# Patient Record
Sex: MALE | HISPANIC OR LATINO | URBAN - METROPOLITAN AREA
[De-identification: names, ages, dates, MRNs, and addresses within clinical notes are randomized per-mention and may not be internally consistent; named-entity substitution may affect disease eponyms.]

---
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
## 2020-04-08 ENCOUNTER — TELEPHONE (OUTPATIENT)
Dept: SCHEDULING | Facility: IMAGING CENTER | Age: 20
End: 2020-04-08

## 2020-04-10 ENCOUNTER — OFFICE VISIT (OUTPATIENT)
Dept: MEDICAL GROUP | Facility: LAB | Age: 20
End: 2020-04-10

## 2020-04-10 VITALS
RESPIRATION RATE: 13 BRPM | BODY MASS INDEX: 40.8 KG/M2 | DIASTOLIC BLOOD PRESSURE: 82 MMHG | HEIGHT: 70 IN | OXYGEN SATURATION: 96 % | WEIGHT: 285 LBS | TEMPERATURE: 97.9 F | HEART RATE: 89 BPM | SYSTOLIC BLOOD PRESSURE: 126 MMHG

## 2020-04-10 DIAGNOSIS — J45.20 MILD INTERMITTENT ASTHMA WITHOUT COMPLICATION: ICD-10-CM

## 2020-04-10 DIAGNOSIS — F33.0 MILD EPISODE OF RECURRENT MAJOR DEPRESSIVE DISORDER (HCC): ICD-10-CM

## 2020-04-10 DIAGNOSIS — F90.2 ATTENTION DEFICIT HYPERACTIVITY DISORDER (ADHD), COMBINED TYPE: ICD-10-CM

## 2020-04-10 DIAGNOSIS — G43.009 MIGRAINE WITHOUT AURA AND WITHOUT STATUS MIGRAINOSUS, NOT INTRACTABLE: ICD-10-CM

## 2020-04-10 PROCEDURE — 99203 OFFICE O/P NEW LOW 30 MIN: CPT | Performed by: FAMILY MEDICINE

## 2020-04-10 RX ORDER — GUANFACINE 2 MG/1
TABLET, EXTENDED RELEASE ORAL
COMMUNITY
End: 2020-04-10

## 2020-04-10 RX ORDER — GUANFACINE 1 MG/1
TABLET, EXTENDED RELEASE ORAL
COMMUNITY
End: 2020-04-10

## 2020-04-10 RX ORDER — ESCITALOPRAM OXALATE 20 MG/1
20 TABLET ORAL DAILY
Qty: 30 TAB | Refills: 3
Start: 2020-04-10 | End: 2020-05-10

## 2020-04-10 RX ORDER — FLUTICASONE PROPIONATE 50 MCG
SPRAY, SUSPENSION (ML) NASAL
COMMUNITY
End: 2020-04-10

## 2020-04-10 RX ORDER — ALBUTEROL SULFATE 90 UG/1
AEROSOL, METERED RESPIRATORY (INHALATION)
COMMUNITY
End: 2020-04-10

## 2020-04-10 RX ORDER — GUANFACINE 2 MG/1
TABLET, EXTENDED RELEASE ORAL
COMMUNITY

## 2020-04-10 RX ORDER — ESCITALOPRAM OXALATE 20 MG/1
TABLET ORAL
COMMUNITY
End: 2020-04-10

## 2020-04-10 RX ORDER — TRIAMCINOLONE ACETONIDE 1 MG/G
OINTMENT TOPICAL
COMMUNITY
End: 2020-04-10

## 2020-04-10 RX ORDER — CLINDAMYCIN AND BENZOYL PEROXIDE 10; 50 MG/G; MG/G
GEL TOPICAL
COMMUNITY
End: 2020-04-10

## 2020-04-10 SDOH — HEALTH STABILITY: MENTAL HEALTH: HOW OFTEN DO YOU HAVE A DRINK CONTAINING ALCOHOL?: NEVER

## 2020-04-10 ASSESSMENT — ENCOUNTER SYMPTOMS
FEVER: 0
VOMITING: 0
BLURRED VISION: 0
WHEEZING: 0
CHILLS: 0
PALPITATIONS: 0
ABDOMINAL PAIN: 0
CONSTIPATION: 0
NAUSEA: 0
DIARRHEA: 0

## 2020-04-10 ASSESSMENT — PATIENT HEALTH QUESTIONNAIRE - PHQ9
5. POOR APPETITE OR OVEREATING: 1 - SEVERAL DAYS
CLINICAL INTERPRETATION OF PHQ2 SCORE: 1
SUM OF ALL RESPONSES TO PHQ QUESTIONS 1-9: 6

## 2020-04-10 NOTE — PROGRESS NOTES
Micah Pena is a 19 y.o. male here for   Chief Complaint   Patient presents with   • Establish Care   • Emesis     Vomiting, once on wednesday. HX of migreine        HPI:  Micah is a very pleasant 19 y.o. male.     #Migraines  -Chronic condition, new to me.  -States is having migraines approximately 1-2 times a month.  -Has full headaches, throbbing, associated with phonophobia, photophobia, nausea, vomiting.  -Treat with magnesium.  -States he has no difficulty or problem with the amount of control he is in.    #ADHD  -Chronic condition, new to me.  Both hyperactive and attention deficit disorder.  -Is been on previous medication; however, currently is taking guanfacine 2 mg for the last 2 to 3 years.  He states that it is been very helpful, been able to focus better at school in, as well as at work.  -Denies any side effect from medication, wishes to continue medication at this time.    #Anxiety, depression  -Patient states his longstanding history of anxiety and depression.  He is currently on Lexapro 20 mg daily.  Is compliant medication.  Denies any side effects including increased headaches, GI upset, sexual dysfunction.  -States that it is helping.  He does have slightly increased anxiety due to the pandemic; however, he is able to handle this new anxiety well and has no concerns or questions at this time.  -Denies any suicidal/homicidal ideations, hallucinations, delusions, paranoia.     #Asthma  -Chronic issue, new to me.  He states that his is gotten older the asthma has improved.  -Longstanding history since a child.    -He has been using albuterol a couple of times every month.  Denies any wheezing, shortness of breath, cough, difficulty breathing.        Current medicines (including changes today)  Current Outpatient Medications   Medication Sig Dispense Refill   • GuanFACINE HCl 2 MG TABLET SR 24 HR guanfacine ER 2 mg tablet,extended release 24 hr   TAKE 1 TABLET BY MOUTH EVERY DAY AT BEDTIME  "AS DIRECTED     • escitalopram (LEXAPRO) 20 MG tablet Take 1 Tab by mouth every day for 30 days. 30 Tab 3   • albuterol (VENTOLIN HFA) 108 (90 Base) MCG/ACT Aero Soln inhalation aerosol INHALE 2 PUFFS BY MOUTH EVERY 6 HOURS AS NEEDED FOR WHEEZING 18 Inhaler 1   • adapalene (DIFFERIN) 0.1 % cream Apply pea size amount to affected skin on face and back once each night 45 g 3   • CLARITIN PO Take  by mouth.       No current facility-administered medications for this visit.      He  has a past medical history of Allergy, Anxiety, ASTHMA, Depression, Head ache, and Migraine.  He  has no past surgical history on file.  Social History     Tobacco Use   • Smoking status: Never Smoker   • Smokeless tobacco: Never Used   Substance Use Topics   • Alcohol use: No   • Drug use: No     Social History     Social History Narrative   • Not on file     Family History   Problem Relation Age of Onset   • No Known Problems Mother    • Diabetes Father      Family Status   Relation Name Status   • Mo  Alive   • Fa  Alive         ROS  Review of Systems   Constitutional: Negative for chills and fever.   HENT: Negative for hearing loss.    Eyes: Negative for blurred vision.   Respiratory: Negative for wheezing.    Cardiovascular: Negative for chest pain and palpitations.   Gastrointestinal: Negative for abdominal pain, constipation, diarrhea, nausea and vomiting.   Skin: Negative for rash.   All other systems reviewed and are negative.       Objective:     /82 (BP Location: Right arm, Patient Position: Sitting, BP Cuff Size: Adult long)   Pulse 89   Temp 36.6 °C (97.9 °F) (Temporal)   Resp 13   Ht 1.765 m (5' 9.5\")   Wt (!) 129.3 kg (285 lb)   SpO2 96%  Body mass index is 41.48 kg/m².  Physical Exam:    Constitutional: Alert, no distress.  Skin: Warm, dry, good turgor, no rashes in visible areas.  Eye: Equal, round and reactive, conjunctiva clear, lids normal.  ENMT: Lips without lesions, good dentition, oropharynx clear. TM's " pearly gray with normal light reflexes bilaterally  Neck: Trachea midline, no masses, no thyromegaly. No cervical or supraclavicular lymphadenopathy.  Respiratory: Unlabored respiratory effort, lungs clear to auscultation bilaterally, no wheezes, rales, or ronchi.  Cardiovascular: Normal S1, S2, RRR, no murmur, no edema.  Abdomen: Soft, non-tender, no masses, no hepatosplenomegaly.  Psych: Alert and oriented x3, normal affect and mood.    Depression Screening    Little interest or pleasure in doing things?  0 - not at all   Feeling down, depressed , or hopeless? 1 - several days   Trouble falling or staying asleep, or sleeping too much?  1 - several days   Feeling tired or having little energy?  1 - several days   Poor appetite or overeating?  1 - several days   Feeling bad about yourself - or that you are a failure or have let yourself or your family down? 1 - several days   Trouble concentrating on things, such as reading the newspaper or watching television? 0 - not at all   Moving or speaking so slowly that other people could have noticed.  Or the opposite - being so fidgety or restless that you have been moving around a lot more than usual?  1 - several days   Thoughts that you would be better off dead, or of hurting yourself?  0 - not at all   Patient Health Questionnaire Score: 6       Assessment and Plan:   The following treatment plan was discussed    1. Migraine without aura and without status migrainosus, not intractable  -Status: stable.   -Continue treatment with magnesium as needed.   -Follow up if migraines worsen.     2. Mild episode of recurrent major depressive disorder (HCC)  -PHQ9 score: 6   -Will continue to treat with Lexapro, refill given.  -Suicide prevention plan.  Follow-up as needed.  - escitalopram (LEXAPRO) 20 MG tablet; Take 1 Tab by mouth every day for 30 days.  Dispense: 30 Tab; Refill: 3    3. Mild intermittent asthma without complication  Symptoms consistent for mild intermittent  asthma.  Will continue to use albuterol as needed.  -Follow-up if symptoms worsen including using albuterol more than twice a week, nighttime awakenings more than once a month.  -Continue to work on good allergy control  -Follow-up as needed.    4. Attention deficit hyperactivity disorder (ADHD), combined type  -Status: Stable.  Will continue treatment with guanfacine 2 mg tablets.  -Follow-up as needed.    Records requested.  Followup: Return if symptoms worsen or fail to improve.         This note was created using voice recognition software. I have made every reasonable attempt to correct errors, however, I do anticipate some grammatical errors.

## 2020-04-10 NOTE — LETTER
April 10, 2020    To Whom It May Concern:         This is confirmation that Micah Pena attended his scheduled appointment with Fly Elkins M.D. on 4/10/20.         If you have any questions please do not hesitate to call me at the phone number listed below.    Sincerely,          Fly Elkins M.D.  [phone]

## 2020-04-13 ENCOUNTER — TELEPHONE (OUTPATIENT)
Dept: MEDICAL GROUP | Facility: LAB | Age: 20
End: 2020-04-13

## 2020-04-13 NOTE — TELEPHONE ENCOUNTER
1. Caller Name: PenaCarin                          Call Back Number: [phone] (home)         How would the patient prefer to be contacted with a response: Phone call do NOT leave a detailed message    Patient Carin Pena emergency contact called and spoke with me needing to update his letter, clearing him to go back to work. Please revise letter.

## 2020-04-13 NOTE — LETTER
April 13, 2020    To Whom It May Concern:         This is confirmation that Micah Pena attended his scheduled appointment with Fly Elkins M.D. on 4/13/20. He is cleared to return to work without any restrictions.          If you have any questions please do not hesitate to call me at the phone number listed below.    Sincerely,          Fly Elkins M.D.  [phone]

## 2021-09-21 NOTE — TELEPHONE ENCOUNTER
Group Therapy Note    Date: 9/21/2021    Group Start Time: 1100  Group End Time: 1150  Group Topic: Recreational    [facility]    Rhona Pimple        Group Therapy Note    Pt did not attend recreational group d/t resting in room despite staff invitation to attend. 1:1 talk time offered as alternative to group session, pt declined. Was the patient seen in the last year in this department? Yes    Does patient have an active prescription for medications requested? Yes    Received Request Via: Pharmacy